# Patient Record
Sex: FEMALE | Race: OTHER | NOT HISPANIC OR LATINO
[De-identification: names, ages, dates, MRNs, and addresses within clinical notes are randomized per-mention and may not be internally consistent; named-entity substitution may affect disease eponyms.]

---

## 2018-05-11 ENCOUNTER — RESULT REVIEW (OUTPATIENT)
Age: 23
End: 2018-05-11

## 2018-10-29 ENCOUNTER — OUTPATIENT (OUTPATIENT)
Dept: OUTPATIENT SERVICES | Age: 23
LOS: 1 days | Discharge: HOME | End: 2018-10-29

## 2018-10-29 PROBLEM — Z00.00 ENCOUNTER FOR PREVENTIVE HEALTH EXAMINATION: Status: ACTIVE | Noted: 2018-10-29

## 2018-10-30 ENCOUNTER — APPOINTMENT (OUTPATIENT)
Dept: PEDIATRIC CARDIOLOGY | Facility: CLINIC | Age: 23
End: 2018-10-30
Payer: MEDICAID

## 2018-10-30 PROCEDURE — ZZZZZ: CPT

## 2019-01-11 ENCOUNTER — OUTPATIENT (OUTPATIENT)
Dept: OUTPATIENT SERVICES | Facility: HOSPITAL | Age: 24
LOS: 1 days | End: 2019-01-11
Payer: MEDICAID

## 2019-01-11 DIAGNOSIS — Z3A.39 39 WEEKS GESTATION OF PREGNANCY: ICD-10-CM

## 2019-01-11 DIAGNOSIS — Z01.818 ENCOUNTER FOR OTHER PREPROCEDURAL EXAMINATION: ICD-10-CM

## 2019-01-11 LAB
ANION GAP SERPL CALC-SCNC: 12 MMOL/L — SIGNIFICANT CHANGE UP (ref 5–17)
APTT BLD: 30.5 SEC — SIGNIFICANT CHANGE UP (ref 27.5–36.3)
BUN SERPL-MCNC: 7 MG/DL — SIGNIFICANT CHANGE UP (ref 7–18)
CALCIUM SERPL-MCNC: 8.7 MG/DL — SIGNIFICANT CHANGE UP (ref 8.4–10.5)
CHLORIDE SERPL-SCNC: 109 MMOL/L — HIGH (ref 96–108)
CO2 SERPL-SCNC: 21 MMOL/L — LOW (ref 22–31)
CREAT SERPL-MCNC: 0.49 MG/DL — LOW (ref 0.5–1.3)
GLUCOSE SERPL-MCNC: 94 MG/DL — SIGNIFICANT CHANGE UP (ref 70–99)
HCT VFR BLD CALC: 43.7 % — SIGNIFICANT CHANGE UP (ref 34.5–45)
HGB BLD-MCNC: 13.6 G/DL — SIGNIFICANT CHANGE UP (ref 11.5–15.5)
INR BLD: 0.97 RATIO — SIGNIFICANT CHANGE UP (ref 0.88–1.16)
MCHC RBC-ENTMCNC: 25.6 PG — LOW (ref 27–34)
MCHC RBC-ENTMCNC: 31.2 GM/DL — LOW (ref 32–36)
MCV RBC AUTO: 82.1 FL — SIGNIFICANT CHANGE UP (ref 80–100)
PLATELET # BLD AUTO: 314 K/UL — SIGNIFICANT CHANGE UP (ref 150–400)
POTASSIUM SERPL-MCNC: 3.8 MMOL/L — SIGNIFICANT CHANGE UP (ref 3.5–5.3)
POTASSIUM SERPL-SCNC: 3.8 MMOL/L — SIGNIFICANT CHANGE UP (ref 3.5–5.3)
PROTHROM AB SERPL-ACNC: 10.8 SEC — SIGNIFICANT CHANGE UP (ref 10–12.9)
RBC # BLD: 5.33 M/UL — HIGH (ref 3.8–5.2)
RBC # FLD: 14.4 % — SIGNIFICANT CHANGE UP (ref 10.3–14.5)
SODIUM SERPL-SCNC: 142 MMOL/L — SIGNIFICANT CHANGE UP (ref 135–145)
WBC # BLD: 9.3 K/UL — SIGNIFICANT CHANGE UP (ref 3.8–10.5)
WBC # FLD AUTO: 9.3 K/UL — SIGNIFICANT CHANGE UP (ref 3.8–10.5)

## 2019-01-11 PROCEDURE — 36415 COLL VENOUS BLD VENIPUNCTURE: CPT

## 2019-01-11 PROCEDURE — 85730 THROMBOPLASTIN TIME PARTIAL: CPT

## 2019-01-11 PROCEDURE — 86780 TREPONEMA PALLIDUM: CPT

## 2019-01-11 PROCEDURE — 85610 PROTHROMBIN TIME: CPT

## 2019-01-11 PROCEDURE — 85027 COMPLETE CBC AUTOMATED: CPT

## 2019-01-11 PROCEDURE — 86901 BLOOD TYPING SEROLOGIC RH(D): CPT

## 2019-01-11 PROCEDURE — 80048 BASIC METABOLIC PNL TOTAL CA: CPT

## 2019-01-11 PROCEDURE — 86900 BLOOD TYPING SEROLOGIC ABO: CPT

## 2019-01-11 PROCEDURE — 86850 RBC ANTIBODY SCREEN: CPT

## 2019-01-12 LAB — T PALLIDUM AB TITR SER: NEGATIVE — SIGNIFICANT CHANGE UP

## 2019-01-16 ENCOUNTER — TRANSCRIPTION ENCOUNTER (OUTPATIENT)
Age: 24
End: 2019-01-16

## 2019-01-17 ENCOUNTER — INPATIENT (INPATIENT)
Facility: HOSPITAL | Age: 24
LOS: 2 days | Discharge: HOME | End: 2019-01-20
Attending: OBSTETRICS & GYNECOLOGY | Admitting: OBSTETRICS & GYNECOLOGY
Payer: MEDICAID

## 2019-01-17 ENCOUNTER — RESULT REVIEW (OUTPATIENT)
Age: 24
End: 2019-01-17

## 2019-01-17 VITALS
TEMPERATURE: 99 F | RESPIRATION RATE: 18 BRPM | HEART RATE: 84 BPM | OXYGEN SATURATION: 99 % | SYSTOLIC BLOOD PRESSURE: 129 MMHG | DIASTOLIC BLOOD PRESSURE: 64 MMHG

## 2019-01-17 DIAGNOSIS — Z3A.39 39 WEEKS GESTATION OF PREGNANCY: ICD-10-CM

## 2019-01-17 LAB
HIV 1 & 2 AB SERPL IA.RAPID: SIGNIFICANT CHANGE UP
HIV 1+2 AB+HIV1 P24 AG SERPL QL IA: SIGNIFICANT CHANGE UP

## 2019-01-17 PROCEDURE — 88307 TISSUE EXAM BY PATHOLOGIST: CPT | Mod: 26

## 2019-01-17 PROCEDURE — 59514 CESAREAN DELIVERY ONLY: CPT | Mod: AS,U9

## 2019-01-17 RX ORDER — SODIUM CHLORIDE 9 MG/ML
1000 INJECTION, SOLUTION INTRAVENOUS ONCE
Qty: 0 | Refills: 0 | Status: COMPLETED | OUTPATIENT
Start: 2019-01-17 | End: 2019-01-17

## 2019-01-17 RX ORDER — ACETAMINOPHEN 500 MG
1000 TABLET ORAL ONCE
Qty: 0 | Refills: 0 | Status: DISCONTINUED | OUTPATIENT
Start: 2019-01-17 | End: 2019-01-20

## 2019-01-17 RX ORDER — OXYTOCIN 10 UNIT/ML
41.67 VIAL (ML) INJECTION
Qty: 20 | Refills: 0 | Status: DISCONTINUED | OUTPATIENT
Start: 2019-01-17 | End: 2019-01-20

## 2019-01-17 RX ORDER — CEFAZOLIN SODIUM 1 G
2000 VIAL (EA) INJECTION ONCE
Qty: 0 | Refills: 0 | Status: COMPLETED | OUTPATIENT
Start: 2019-01-17 | End: 2019-01-17

## 2019-01-17 RX ORDER — HEPARIN SODIUM 5000 [USP'U]/ML
5000 INJECTION INTRAVENOUS; SUBCUTANEOUS EVERY 12 HOURS
Qty: 0 | Refills: 0 | Status: DISCONTINUED | OUTPATIENT
Start: 2019-01-17 | End: 2019-01-20

## 2019-01-17 RX ORDER — SODIUM CHLORIDE 9 MG/ML
1000 INJECTION, SOLUTION INTRAVENOUS
Qty: 0 | Refills: 0 | Status: DISCONTINUED | OUTPATIENT
Start: 2019-01-17 | End: 2019-01-20

## 2019-01-17 RX ORDER — DIPHENHYDRAMINE HCL 50 MG
50 CAPSULE ORAL EVERY 6 HOURS
Qty: 0 | Refills: 0 | Status: DISCONTINUED | OUTPATIENT
Start: 2019-01-17 | End: 2019-01-20

## 2019-01-17 RX ORDER — CITRIC ACID/SODIUM CITRATE 300-500 MG
30 SOLUTION, ORAL ORAL ONCE
Qty: 0 | Refills: 0 | Status: COMPLETED | OUTPATIENT
Start: 2019-01-17 | End: 2019-01-17

## 2019-01-17 RX ORDER — FERROUS SULFATE 325(65) MG
325 TABLET ORAL DAILY
Qty: 0 | Refills: 0 | Status: DISCONTINUED | OUTPATIENT
Start: 2019-01-17 | End: 2019-01-20

## 2019-01-17 RX ORDER — KETOROLAC TROMETHAMINE 30 MG/ML
30 SYRINGE (ML) INJECTION EVERY 6 HOURS
Qty: 0 | Refills: 0 | Status: DISCONTINUED | OUTPATIENT
Start: 2019-01-17 | End: 2019-01-20

## 2019-01-17 RX ORDER — SODIUM CHLORIDE 9 MG/ML
1000 INJECTION, SOLUTION INTRAVENOUS
Qty: 0 | Refills: 0 | Status: DISCONTINUED | OUTPATIENT
Start: 2019-01-17 | End: 2019-01-17

## 2019-01-17 RX ORDER — DOCUSATE SODIUM 100 MG
100 CAPSULE ORAL
Qty: 0 | Refills: 0 | Status: DISCONTINUED | OUTPATIENT
Start: 2019-01-17 | End: 2019-01-18

## 2019-01-17 RX ORDER — LEVOTHYROXINE SODIUM 125 MCG
75 TABLET ORAL DAILY
Qty: 0 | Refills: 0 | Status: DISCONTINUED | OUTPATIENT
Start: 2019-01-17 | End: 2019-01-20

## 2019-01-17 RX ORDER — DIPHENHYDRAMINE HCL 50 MG
25 CAPSULE ORAL EVERY 6 HOURS
Qty: 0 | Refills: 0 | Status: DISCONTINUED | OUTPATIENT
Start: 2019-01-17 | End: 2019-01-20

## 2019-01-17 RX ORDER — METOCLOPRAMIDE HCL 10 MG
10 TABLET ORAL ONCE
Qty: 0 | Refills: 0 | Status: DISCONTINUED | OUTPATIENT
Start: 2019-01-17 | End: 2019-01-17

## 2019-01-17 RX ORDER — TETANUS TOXOID, REDUCED DIPHTHERIA TOXOID AND ACELLULAR PERTUSSIS VACCINE, ADSORBED 5; 2.5; 8; 8; 2.5 [IU]/.5ML; [IU]/.5ML; UG/.5ML; UG/.5ML; UG/.5ML
0.5 SUSPENSION INTRAMUSCULAR ONCE
Qty: 0 | Refills: 0 | Status: DISCONTINUED | OUTPATIENT
Start: 2019-01-17 | End: 2019-01-20

## 2019-01-17 RX ORDER — SIMETHICONE 80 MG/1
80 TABLET, CHEWABLE ORAL EVERY 4 HOURS
Qty: 0 | Refills: 0 | Status: DISCONTINUED | OUTPATIENT
Start: 2019-01-17 | End: 2019-01-20

## 2019-01-17 RX ADMIN — Medication 30 MILLIGRAM(S): at 12:26

## 2019-01-17 RX ADMIN — Medication 30 MILLIGRAM(S): at 22:36

## 2019-01-17 RX ADMIN — SODIUM CHLORIDE 2000 MILLILITER(S): 9 INJECTION, SOLUTION INTRAVENOUS at 06:18

## 2019-01-17 RX ADMIN — Medication 30 MILLILITER(S): at 09:04

## 2019-01-17 RX ADMIN — Medication 30 MILLIGRAM(S): at 12:56

## 2019-01-17 RX ADMIN — Medication 30 MILLIGRAM(S): at 23:10

## 2019-01-17 RX ADMIN — Medication 50 MILLIGRAM(S): at 17:10

## 2019-01-17 RX ADMIN — SODIUM CHLORIDE 125 MILLILITER(S): 9 INJECTION, SOLUTION INTRAVENOUS at 07:25

## 2019-01-17 RX ADMIN — HEPARIN SODIUM 5000 UNIT(S): 5000 INJECTION INTRAVENOUS; SUBCUTANEOUS at 22:35

## 2019-01-17 RX ADMIN — Medication 125 MILLIUNIT(S)/MIN: at 11:11

## 2019-01-17 RX ADMIN — Medication 100 MILLIGRAM(S): at 09:08

## 2019-01-17 NOTE — PROGRESS NOTE ADULT - SUBJECTIVE AND OBJECTIVE BOX
Patient seen resting comfortably.  No new complaints.  Denies HA, CP, SOB, N/V/D, dizziness, palpitations, worsening abdominal pain, worsening vaginal bleeding, or any other concerns.  Requesting benadryl for generalized itching.      Vital Signs Last 24 Hrs  T(C): 36.9 (2019 14:49), Max: 37 (2019 11:00)  T(F): 98.5 (2019 14:49), Max: 98.6 (2019 11:00)  HR: 89 (2019 14:49) (81 - 89)  BP: 136/79 (2019 14:49) (120/66 - 136/79)  RR: 17 (2019 14:49) (17 - 18)  SpO2: 97% (2019 14:49) (97% - 100%)    Gen: A&O x 3, NAD  Chest: CTABL  Cardiac: S1+S2+ RRR  Breast: Soft, nontender, nonengorged  Abdomen: Nontender, nondistended, +BS, Dressing C/D/I  Gyn: Minimal bleeding, hammer in place draining clear urine  Extremities: Nontender, DTRS 2+, no worsening edema, venodynes intact        A/P:  Patient s/p  section, POD #0.     -Pain management as needed  -Advance as per protocol  -OOB and ambulate in am   -Repeat CBC in am   -DC hammer and trial of void  -Advance diet with flatus  -Encourage breastfeeding

## 2019-01-18 LAB
BASOPHILS # BLD AUTO: 0 K/UL — SIGNIFICANT CHANGE UP (ref 0–0.2)
BASOPHILS NFR BLD AUTO: 0.5 % — SIGNIFICANT CHANGE UP (ref 0–2)
EOSINOPHIL # BLD AUTO: 0 K/UL — SIGNIFICANT CHANGE UP (ref 0–0.5)
EOSINOPHIL NFR BLD AUTO: 0.5 % — SIGNIFICANT CHANGE UP (ref 0–6)
HCT VFR BLD CALC: 33.3 % — LOW (ref 34.5–45)
HGB BLD-MCNC: 10.3 G/DL — LOW (ref 11.5–15.5)
LYMPHOCYTES # BLD AUTO: 2.2 K/UL — SIGNIFICANT CHANGE UP (ref 1–3.3)
LYMPHOCYTES # BLD AUTO: 25.1 % — SIGNIFICANT CHANGE UP (ref 13–44)
MCHC RBC-ENTMCNC: 25.8 PG — LOW (ref 27–34)
MCHC RBC-ENTMCNC: 31 GM/DL — LOW (ref 32–36)
MCV RBC AUTO: 83.3 FL — SIGNIFICANT CHANGE UP (ref 80–100)
MONOCYTES # BLD AUTO: 0.9 K/UL — SIGNIFICANT CHANGE UP (ref 0–0.9)
MONOCYTES NFR BLD AUTO: 9.6 % — SIGNIFICANT CHANGE UP (ref 2–14)
NEUTROPHILS # BLD AUTO: 5.7 K/UL — SIGNIFICANT CHANGE UP (ref 1.8–7.4)
NEUTROPHILS NFR BLD AUTO: 64.3 % — SIGNIFICANT CHANGE UP (ref 43–77)
PLATELET # BLD AUTO: 233 K/UL — SIGNIFICANT CHANGE UP (ref 150–400)
RBC # BLD: 4 M/UL — SIGNIFICANT CHANGE UP (ref 3.8–5.2)
RBC # FLD: 14.7 % — HIGH (ref 10.3–14.5)
WBC # BLD: 8.9 K/UL — SIGNIFICANT CHANGE UP (ref 3.8–10.5)
WBC # FLD AUTO: 8.9 K/UL — SIGNIFICANT CHANGE UP (ref 3.8–10.5)

## 2019-01-18 RX ORDER — DOCUSATE SODIUM 100 MG
100 CAPSULE ORAL
Qty: 0 | Refills: 0 | Status: DISCONTINUED | OUTPATIENT
Start: 2019-01-18 | End: 2019-01-20

## 2019-01-18 RX ORDER — OXYCODONE AND ACETAMINOPHEN 5; 325 MG/1; MG/1
1 TABLET ORAL EVERY 6 HOURS
Qty: 0 | Refills: 0 | Status: DISCONTINUED | OUTPATIENT
Start: 2019-01-18 | End: 2019-01-20

## 2019-01-18 RX ORDER — OXYCODONE AND ACETAMINOPHEN 5; 325 MG/1; MG/1
2 TABLET ORAL EVERY 6 HOURS
Qty: 0 | Refills: 0 | Status: DISCONTINUED | OUTPATIENT
Start: 2019-01-18 | End: 2019-01-20

## 2019-01-18 RX ORDER — IBUPROFEN 200 MG
600 TABLET ORAL EVERY 6 HOURS
Qty: 0 | Refills: 0 | Status: DISCONTINUED | OUTPATIENT
Start: 2019-01-18 | End: 2019-01-20

## 2019-01-18 RX ADMIN — Medication 325 MILLIGRAM(S): at 13:35

## 2019-01-18 RX ADMIN — Medication 600 MILLIGRAM(S): at 13:00

## 2019-01-18 RX ADMIN — OXYCODONE AND ACETAMINOPHEN 1 TABLET(S): 5; 325 TABLET ORAL at 19:30

## 2019-01-18 RX ADMIN — Medication 30 MILLIGRAM(S): at 07:15

## 2019-01-18 RX ADMIN — Medication 600 MILLIGRAM(S): at 12:29

## 2019-01-18 RX ADMIN — Medication 600 MILLIGRAM(S): at 18:55

## 2019-01-18 RX ADMIN — OXYCODONE AND ACETAMINOPHEN 1 TABLET(S): 5; 325 TABLET ORAL at 18:56

## 2019-01-18 RX ADMIN — Medication 1 TABLET(S): at 13:35

## 2019-01-18 RX ADMIN — Medication 100 MILLIGRAM(S): at 18:56

## 2019-01-18 RX ADMIN — Medication 30 MILLIGRAM(S): at 05:52

## 2019-01-18 RX ADMIN — Medication 600 MILLIGRAM(S): at 06:07

## 2019-01-18 RX ADMIN — SIMETHICONE 80 MILLIGRAM(S): 80 TABLET, CHEWABLE ORAL at 05:52

## 2019-01-18 RX ADMIN — SIMETHICONE 80 MILLIGRAM(S): 80 TABLET, CHEWABLE ORAL at 23:29

## 2019-01-18 RX ADMIN — HEPARIN SODIUM 5000 UNIT(S): 5000 INJECTION INTRAVENOUS; SUBCUTANEOUS at 13:36

## 2019-01-18 RX ADMIN — Medication 75 MICROGRAM(S): at 05:52

## 2019-01-18 RX ADMIN — HEPARIN SODIUM 5000 UNIT(S): 5000 INJECTION INTRAVENOUS; SUBCUTANEOUS at 23:29

## 2019-01-18 NOTE — PROGRESS NOTE ADULT - SUBJECTIVE AND OBJECTIVE BOX
[facility] no new complaints. Pt out of bed to chair, hammer removed, due to void, - flatus; tolerating clear diet. both breastfeeding and bottle feeding. Denies HA, CP, SOB, N/V/D,  no bm; dizziness, palpitations, worsening abdominal pain, worsening vaginal bleeding, or any other concerns.   Vital Signs Last 24 Hrs  T(C): 36.8 (18 Jan 2019 04:19), Max: 37.2 (17 Jan 2019 19:19)  T(F): 98.2 (18 Jan 2019 04:19), Max: 99 (17 Jan 2019 19:19)  HR: 93 (18 Jan 2019 04:19) (81 - 100)  BP: 130/76 (18 Jan 2019 04:19) (115/67 - 136/79)  BP(mean): --  RR: 18 (18 Jan 2019 04:19) (17 - 18)  SpO2: 98% (18 Jan 2019 04:19) (97% - 100%)    Gen: A&O x 3, NAD  Chest: CTABL  Cardiac: S1+S2+ RRR  Breast: Soft, nontender, nonengorged  Abdomen: +BS; soft; Nontender, nondistended, fundus firm, dressing removed, Incision C/D/I steri strips in place   Gyn: Minimal lochia  Extremities: Nontender, DTRS 2+, no worsening edema                        10.3   8.9   )-----------( 233      ( 18 Jan 2019 06:39 )             33.3       A/P: 23 year old POD #1 s/p c/s     -Pain management as needed  -cont post op care  -adv diet to regular with flatus   -OOB and ambulate  -f/u Rpt CBC in am   -encourage incentive spirometer use  -Encourage breastfeeding   -d/w dr. Garcia

## 2019-01-18 NOTE — PROGRESS NOTE ADULT - PROBLEM SELECTOR PLAN 1
-Pain management as needed  -cont post op care  -adv diet to regular with flatus   -OOB and ambulate  -f/u Rpt CBC in am   -encourage incentive spirometer use  -Encourage breastfeeding   -d/w dr. Garcia

## 2019-01-19 ENCOUNTER — TRANSCRIPTION ENCOUNTER (OUTPATIENT)
Age: 24
End: 2019-01-19

## 2019-01-19 LAB
BASOPHILS # BLD AUTO: 0.1 K/UL — SIGNIFICANT CHANGE UP (ref 0–0.2)
BASOPHILS NFR BLD AUTO: 1.1 % — SIGNIFICANT CHANGE UP (ref 0–2)
EOSINOPHIL # BLD AUTO: 0.1 K/UL — SIGNIFICANT CHANGE UP (ref 0–0.5)
EOSINOPHIL NFR BLD AUTO: 1.2 % — SIGNIFICANT CHANGE UP (ref 0–6)
HCT VFR BLD CALC: 38 % — SIGNIFICANT CHANGE UP (ref 34.5–45)
HGB BLD-MCNC: 11.6 G/DL — SIGNIFICANT CHANGE UP (ref 11.5–15.5)
LYMPHOCYTES # BLD AUTO: 2.5 K/UL — SIGNIFICANT CHANGE UP (ref 1–3.3)
LYMPHOCYTES # BLD AUTO: 24.8 % — SIGNIFICANT CHANGE UP (ref 13–44)
MCHC RBC-ENTMCNC: 25.8 PG — LOW (ref 27–34)
MCHC RBC-ENTMCNC: 30.5 GM/DL — LOW (ref 32–36)
MCV RBC AUTO: 84.4 FL — SIGNIFICANT CHANGE UP (ref 80–100)
MONOCYTES # BLD AUTO: 0.7 K/UL — SIGNIFICANT CHANGE UP (ref 0–0.9)
MONOCYTES NFR BLD AUTO: 7.2 % — SIGNIFICANT CHANGE UP (ref 2–14)
NEUTROPHILS # BLD AUTO: 6.8 K/UL — SIGNIFICANT CHANGE UP (ref 1.8–7.4)
NEUTROPHILS NFR BLD AUTO: 65.7 % — SIGNIFICANT CHANGE UP (ref 43–77)
PLATELET # BLD AUTO: 281 K/UL — SIGNIFICANT CHANGE UP (ref 150–400)
RBC # BLD: 4.5 M/UL — SIGNIFICANT CHANGE UP (ref 3.8–5.2)
RBC # FLD: 14.6 % — HIGH (ref 10.3–14.5)
WBC # BLD: 10.3 K/UL — SIGNIFICANT CHANGE UP (ref 3.8–10.5)
WBC # FLD AUTO: 10.3 K/UL — SIGNIFICANT CHANGE UP (ref 3.8–10.5)

## 2019-01-19 RX ORDER — IBUPROFEN 200 MG
1 TABLET ORAL
Qty: 30 | Refills: 0 | OUTPATIENT
Start: 2019-01-19

## 2019-01-19 RX ORDER — DOCUSATE SODIUM 100 MG
1 CAPSULE ORAL
Qty: 30 | Refills: 0 | OUTPATIENT
Start: 2019-01-19

## 2019-01-19 RX ADMIN — Medication 1 TABLET(S): at 12:27

## 2019-01-19 RX ADMIN — Medication 325 MILLIGRAM(S): at 15:33

## 2019-01-19 RX ADMIN — Medication 75 MICROGRAM(S): at 05:32

## 2019-01-19 RX ADMIN — Medication 600 MILLIGRAM(S): at 22:35

## 2019-01-19 RX ADMIN — Medication 600 MILLIGRAM(S): at 22:05

## 2019-01-19 RX ADMIN — Medication 600 MILLIGRAM(S): at 16:03

## 2019-01-19 RX ADMIN — Medication 600 MILLIGRAM(S): at 15:33

## 2019-01-19 RX ADMIN — Medication 100 MILLIGRAM(S): at 05:29

## 2019-01-19 RX ADMIN — SIMETHICONE 80 MILLIGRAM(S): 80 TABLET, CHEWABLE ORAL at 22:04

## 2019-01-19 RX ADMIN — SIMETHICONE 80 MILLIGRAM(S): 80 TABLET, CHEWABLE ORAL at 05:29

## 2019-01-19 RX ADMIN — SIMETHICONE 80 MILLIGRAM(S): 80 TABLET, CHEWABLE ORAL at 12:27

## 2019-01-19 RX ADMIN — HEPARIN SODIUM 5000 UNIT(S): 5000 INJECTION INTRAVENOUS; SUBCUTANEOUS at 22:05

## 2019-01-19 RX ADMIN — OXYCODONE AND ACETAMINOPHEN 2 TABLET(S): 5; 325 TABLET ORAL at 05:30

## 2019-01-19 RX ADMIN — HEPARIN SODIUM 5000 UNIT(S): 5000 INJECTION INTRAVENOUS; SUBCUTANEOUS at 12:27

## 2019-01-19 RX ADMIN — OXYCODONE AND ACETAMINOPHEN 2 TABLET(S): 5; 325 TABLET ORAL at 06:08

## 2019-01-19 NOTE — DISCHARGE NOTE OB - HOSPITAL COURSE
Patient is a 23 year old  at 39wks for scheduled repeat c/s.  Uncomplicated procedure and routine post op care.

## 2019-01-19 NOTE — PROGRESS NOTE ADULT - SUBJECTIVE AND OBJECTIVE BOX
Patient seen resting comfortably.  No new complaints.  Denies HA, CP, SOB, N/V/D, dizziness, palpitations, worsening abdominal pain, worsening vaginal bleeding, or any other concerns. + Ambulation, + void without difficulty, + flatus and tolerating regular diet. Attempting breastfeeding.     Vital Signs Last 24 Hrs  T(C): 36.8 (2019 05:38), Max: 36.9 (2019 18:41)  T(F): 98.3 (2019 05:38), Max: 98.5 (2019 18:41)  HR: 82 (2019 05:38) (82 - 100)  BP: 139/82 (2019 05:38) (126/80 - 140/81)  RR: 17 (2019 05:38) (17 - 18)  SpO2: 98% (2019 05:38) (98% - 99%)    Gen: A&O x 3, NAD  Chest: CTABL  Cardiac: S1+S2+ RRR  Breast: Soft, nontender, nonengorged  Abdomen: Nontender, nondistended, +BS, Incision clean, dry and intact   Gyn: Minimal bleeding  Extremities: Nontender, DTRS 2+, no worsening edema                                     11.6   10.3  )-----------( 281      ( 2019 06:31 )             38.0         A/P:  Patient is a 23 year old P2 s/p  section, POD #2.     -Pain management as needed  -Advance as per protocol  -OOB and ambulate  -Repeat CBC   -Encourage breastfeeding   -DC home in am

## 2019-01-19 NOTE — DISCHARGE NOTE OB - CARE PROVIDER_API CALL
- Admit to Surgery  - Keep NPO with IVF  - Pain control, PRN  - GI consultation  - MRCP  - Plan for eventual laparoscopic cholecystectomy Geovanna Bardales), Obstetrics and Gynecology  [address]  [address]  [place] [address]  Phone: [phone]  Fax: [phone]

## 2019-01-19 NOTE — DISCHARGE NOTE OB - CARE PLAN
Principal Discharge DX:	 delivery delivered  Goal:	Pain management and breastfeeding  Assessment and plan of treatment:	Continue breastfeeding.  Motrin as needed for pain.  Ambulate daily.  No heavy lifting or anything per vagina x 6 weeks - no sex, tampons, douching, tub baths, etc.  Follow up in office in 2 weeks for incision check, and then at 6 weeks for postpartum check.

## 2019-01-19 NOTE — DISCHARGE NOTE OB - PATIENT PORTAL LINK FT
You can access the [facility] Patient Portal, offered by [facility], by registering with the following website: [email or web address]

## 2019-01-19 NOTE — DISCHARGE NOTE OB - MATERIALS PROVIDED
Birth Certificate Instructions/Discharge Medication Information for Patients and Families Pocket Guide/Letter of Medical Neccessity/Vaccinations/Breastfeeding Guide and Packet/Breastfeeding Mother’s Support Group Information/Back To Sleep Handout/Tdap Vaccination (VIS Pub Date: January 24, 2012)

## 2019-01-20 VITALS
OXYGEN SATURATION: 99 % | SYSTOLIC BLOOD PRESSURE: 132 MMHG | HEART RATE: 84 BPM | RESPIRATION RATE: 16 BRPM | DIASTOLIC BLOOD PRESSURE: 73 MMHG | TEMPERATURE: 98 F

## 2019-01-20 PROCEDURE — 86900 BLOOD TYPING SEROLOGIC ABO: CPT

## 2019-01-20 PROCEDURE — 88307 TISSUE EXAM BY PATHOLOGIST: CPT

## 2019-01-20 PROCEDURE — 86923 COMPATIBILITY TEST ELECTRIC: CPT

## 2019-01-20 PROCEDURE — 85027 COMPLETE CBC AUTOMATED: CPT

## 2019-01-20 PROCEDURE — 86850 RBC ANTIBODY SCREEN: CPT

## 2019-01-20 PROCEDURE — 87389 HIV-1 AG W/HIV-1&-2 AB AG IA: CPT

## 2019-01-20 PROCEDURE — 86703 HIV-1/HIV-2 1 RESULT ANTBDY: CPT

## 2019-01-20 PROCEDURE — 86901 BLOOD TYPING SEROLOGIC RH(D): CPT

## 2019-01-20 PROCEDURE — 59050 FETAL MONITOR W/REPORT: CPT

## 2019-01-20 PROCEDURE — 36415 COLL VENOUS BLD VENIPUNCTURE: CPT

## 2019-01-20 RX ADMIN — Medication 75 MICROGRAM(S): at 06:27

## 2019-01-20 RX ADMIN — SIMETHICONE 80 MILLIGRAM(S): 80 TABLET, CHEWABLE ORAL at 06:27

## 2019-01-20 RX ADMIN — Medication 100 MILLIGRAM(S): at 06:27

## 2019-01-20 NOTE — PROGRESS NOTE ADULT - SUBJECTIVE AND OBJECTIVE BOX
Patient seen resting comfortably.  No new complaints. Reports incisional pain, controlled on pain medications.  Denies CP, SOB, N/V/D, dizziness, palpitations. Voiding spontaneously, denies dysuria, urgency or frequency. +Flatus, - BM. Tolerating regular diet.     Vital Signs Last 24 Hrs  T(C): 36.8 (20 Jan 2019 06:30), Max: 37 (19 Jan 2019 20:33)  T(F): 98.3 (20 Jan 2019 06:30), Max: 98.6 (19 Jan 2019 20:33)  HR: 84 (20 Jan 2019 06:30) (76 - 96)  BP: 132/73 (20 Jan 2019 06:30) (113/75 - 132/82)  RR: 16 (20 Jan 2019 06:30) (16 - 17)  SpO2: 99% (20 Jan 2019 06:30) (97% - 99%)    Gen: A&O x 3, NAD  Chest: CTABL  Cardiac: S1+S2+ RRR  Breast: Soft, nontender, nonengorged  Abdomen: Nontender, nondistended, +BS, Incision c/d/i, no erythema  Gyn: lochia wnl  Extremities: Nontender, no worsening edema                          11.6   10.3  )-----------( 281      ( 19 Jan 2019 06:31 )             38.0

## 2019-08-23 ENCOUNTER — RESULT REVIEW (OUTPATIENT)
Age: 24
End: 2019-08-23

## 2022-03-22 NOTE — DISCHARGE NOTE OB - BREASTFEEDING PROVIDES STABLE TEMPERATURE THROUGH SKIN TO SKIN CONTACT
"Chief Complaint   Patient presents with     Pre-Op Exam     surgery date 04/06/2022       Initial /80   Pulse 60   Temp 97.5  F (36.4  C) (Tympanic)   Resp 20   Ht 1.657 m (5' 5.25\")   Wt 80.3 kg (177 lb)   LMP  (LMP Unknown)   SpO2 96%   Breastfeeding No   BMI 29.23 kg/m   Estimated body mass index is 29.23 kg/m  as calculated from the following:    Height as of this encounter: 1.657 m (5' 5.25\").    Weight as of this encounter: 80.3 kg (177 lb).  Medication Reconciliation: complete    FOOD SECURITY SCREENING QUESTIONS  Hunger Vital Signs:  Within the past 12 months we worried whether our food would run out before we got money to buy more. Never  Within the past 12 months the food we bought just didn't last and we didn't have money to get more. Never        Advance care directive on file? no  Advance care directive provided to patient? Declined by the patient     Tiffany White LPN  " Statement Selected

## 2025-05-21 NOTE — PATIENT PROFILE OB - ABILITY TO HEAR (WITH HEARING AID OR HEARING APPLIANCE IF NORMALLY USED):
Physical Therapy Visit    Visit Type: Daily Treatment Note  Visit: 4  Referring Provider: Rah Davis MD  Medical Diagnosis (from order): M54.9 - Acute back pain, unspecified back location, unspecified back pain laterality  M48.061 - Spinal stenosis of lumbar region, unspecified whether neurogenic claudication present  M51.369 - Degeneration of intervertebral disc of lumbar region, unspecified whether pain present  M41.9 - Scoliosis, unspecified scoliosis type, unspecified spinal region  M79.18 - Myofascial pain  M79.10 - Myalgia  M80.00XS - Osteoporosis with current pathological fracture, unspecified osteoporosis type, sequela  S22.080S - Compression fracture of T11 vertebra, sequela   Medical Diagnosis (from order): M54.9 - Acute back pain, unspecified back location, unspecified back pain laterality M48.061 - Spinal stenosis of lumbar region, unspecified whether neurogenic claudication present M51.369 - Degeneration of intervertebral disc of lumbar region, unspecified whether pain present M41.9 - Scoliosis, unspecified scoliosis type, unspecified spinal region; M80.00XS - Osteoporosis with current pathological fracture, unspecified osteoporosis type, sequela S22.080S - Compression fracture of T11 vertebra, sequela    SUBJECTIVE                                                                                                               \"I've been having a goofy pain in my stomach but it comes and goes.\"  Pt describes that her back was \"burning\" this morning but is not not.  Describes that this pain is also intermittent.    Pain / Symptoms  - Pain rating (out of 10): Current: 0       OBJECTIVE                                                                                                                                         Treatment     AQUATIC THERAPY: In pool 93 degrees.     GAIT:  At 3'6\" depth x 2 laps each: with noodle for upper extremity support  Forward  Sidestepping     SEATED:  Bicycle x 1  minute  Full arc quadriceps/hamstring curl x 10 together & opposing  Hip abduction/adduction x 10  Hip external rotation / internal rotation x 10 each    STANDING:  Heel Raises x 10  Hamstring Curl x 10  Hip extension x 10  Hip Abduction/Adduction x 10  Marching x 1 minute  Squat x 10    Core stabilization in squat with back to wall:  - Bilateral shoulder flexion x 10 together & opposing  - march x 1 minute    Core stabilization in standing:  - Bilateral shoulder flexion / extension x 10 together & opposing  - Bilateral shoulder horizontal abduction /adduction x 10   - row x 10  - reciprocal punch x 1 minute  Skilled input: verbal instruction/cues    Home Exercise Program  Access Code: [email or web address]  URL: [email or web address]  Date: 05/01/2025  Prepared by: Raman    Exercises  - Supine Posterior Pelvic Tilt  - 1 x daily - 7 x weekly - 3 sets - 10 reps      ASSESSMENT                                                                                                            - pt reporting good response to pool without significant soreness following  - tolerated progression of repetitions with hip extension  - seems to be more comfortable in water, may be ready for progression to ambulation across pool without upper extremity support at noodle  Pain/symptoms after session (out of 10): 0  Education:   - Results of above outlined education: Verbalizes understanding and Needs reinforcement    PLAN                                                                                                                           Suggestions for next session as indicated: Progress per plan of care  Aquatic therapy:  Continue progression of lower extremity strengthening, core stabilization, lower extremity flexibility as tolerated.  Try ambulation across pool without upper extremity support at noodle.  Progress repetitions with exercises as tolerated working toward addition of equipment for upper extremity/core  strengthening.  Avoid trunk flexion due to T11 kyphoplasty, osteoporosis.   Update HEP       Therapy procedure time and total treatment time can be found documented on the Time Entry flowsheet     Adequate: hears normal conversation without difficulty